# Patient Record
Sex: MALE | Race: WHITE | NOT HISPANIC OR LATINO | Employment: OTHER | URBAN - METROPOLITAN AREA
[De-identification: names, ages, dates, MRNs, and addresses within clinical notes are randomized per-mention and may not be internally consistent; named-entity substitution may affect disease eponyms.]

---

## 2023-11-25 ENCOUNTER — APPOINTMENT (EMERGENCY)
Dept: RADIOLOGY | Facility: HOSPITAL | Age: 53
End: 2023-11-25
Payer: COMMERCIAL

## 2023-11-25 ENCOUNTER — HOSPITAL ENCOUNTER (EMERGENCY)
Facility: HOSPITAL | Age: 53
Discharge: HOME | End: 2023-11-25
Attending: EMERGENCY MEDICINE
Payer: COMMERCIAL

## 2023-11-25 VITALS
SYSTOLIC BLOOD PRESSURE: 134 MMHG | TEMPERATURE: 97.5 F | RESPIRATION RATE: 17 BRPM | HEART RATE: 74 BPM | DIASTOLIC BLOOD PRESSURE: 84 MMHG | OXYGEN SATURATION: 98 %

## 2023-11-25 DIAGNOSIS — R07.9 CHEST PAIN, UNSPECIFIED: Primary | ICD-10-CM

## 2023-11-25 DIAGNOSIS — R79.89 ELEVATED SERUM CREATININE: ICD-10-CM

## 2023-11-25 LAB
ALBUMIN SERPL BCP-MCNC: 4.5 G/DL (ref 3.5–5)
ALP SERPL-CCNC: 81 U/L (ref 34–104)
ALT SERPL W P-5'-P-CCNC: 47 U/L (ref 7–52)
ANION GAP SERPL CALCULATED.3IONS-SCNC: 9 MMOL/L
AST SERPL W P-5'-P-CCNC: 23 U/L (ref 13–39)
BASOPHILS # BLD AUTO: 0.04 THOUSANDS/ÂΜL (ref 0–0.1)
BASOPHILS NFR BLD AUTO: 0 % (ref 0–1)
BILIRUB SERPL-MCNC: 0.35 MG/DL (ref 0.2–1)
BUN SERPL-MCNC: 22 MG/DL (ref 5–25)
CALCIUM SERPL-MCNC: 9.4 MG/DL (ref 8.4–10.2)
CARDIAC TROPONIN I PNL SERPL HS: 3 NG/L
CHLORIDE SERPL-SCNC: 102 MMOL/L (ref 96–108)
CO2 SERPL-SCNC: 26 MMOL/L (ref 21–32)
CREAT SERPL-MCNC: 1.44 MG/DL (ref 0.6–1.3)
EOSINOPHIL # BLD AUTO: 0.15 THOUSAND/ÂΜL (ref 0–0.61)
EOSINOPHIL NFR BLD AUTO: 2 % (ref 0–6)
ERYTHROCYTE [DISTWIDTH] IN BLOOD BY AUTOMATED COUNT: 12.7 % (ref 11.6–15.1)
GFR SERPL CREATININE-BSD FRML MDRD: 55 ML/MIN/1.73SQ M
GLUCOSE SERPL-MCNC: 96 MG/DL (ref 65–140)
HCT VFR BLD AUTO: 41.4 % (ref 36.5–49.3)
HGB BLD-MCNC: 14.1 G/DL (ref 12–17)
IMM GRANULOCYTES # BLD AUTO: 0.02 THOUSAND/UL (ref 0–0.2)
IMM GRANULOCYTES NFR BLD AUTO: 0 % (ref 0–2)
LYMPHOCYTES # BLD AUTO: 3.23 THOUSANDS/ÂΜL (ref 0.6–4.47)
LYMPHOCYTES NFR BLD AUTO: 35 % (ref 14–44)
MCH RBC QN AUTO: 31.3 PG (ref 26.8–34.3)
MCHC RBC AUTO-ENTMCNC: 34.1 G/DL (ref 31.4–37.4)
MCV RBC AUTO: 92 FL (ref 82–98)
MONOCYTES # BLD AUTO: 0.93 THOUSAND/ÂΜL (ref 0.17–1.22)
MONOCYTES NFR BLD AUTO: 10 % (ref 4–12)
NEUTROPHILS # BLD AUTO: 4.82 THOUSANDS/ÂΜL (ref 1.85–7.62)
NEUTS SEG NFR BLD AUTO: 53 % (ref 43–75)
NRBC BLD AUTO-RTO: 0 /100 WBCS
PLATELET # BLD AUTO: 268 THOUSANDS/UL (ref 149–390)
PMV BLD AUTO: 10.4 FL (ref 8.9–12.7)
POTASSIUM SERPL-SCNC: 4 MMOL/L (ref 3.5–5.3)
PROT SERPL-MCNC: 7.9 G/DL (ref 6.4–8.4)
RBC # BLD AUTO: 4.51 MILLION/UL (ref 3.88–5.62)
SODIUM SERPL-SCNC: 137 MMOL/L (ref 135–147)
WBC # BLD AUTO: 9.19 THOUSAND/UL (ref 4.31–10.16)

## 2023-11-25 PROCEDURE — 84484 ASSAY OF TROPONIN QUANT: CPT | Performed by: EMERGENCY MEDICINE

## 2023-11-25 PROCEDURE — 36415 COLL VENOUS BLD VENIPUNCTURE: CPT | Performed by: EMERGENCY MEDICINE

## 2023-11-25 PROCEDURE — 99285 EMERGENCY DEPT VISIT HI MDM: CPT

## 2023-11-25 PROCEDURE — 99285 EMERGENCY DEPT VISIT HI MDM: CPT | Performed by: EMERGENCY MEDICINE

## 2023-11-25 PROCEDURE — 85025 COMPLETE CBC W/AUTO DIFF WBC: CPT | Performed by: EMERGENCY MEDICINE

## 2023-11-25 PROCEDURE — 93005 ELECTROCARDIOGRAM TRACING: CPT

## 2023-11-25 PROCEDURE — 80053 COMPREHEN METABOLIC PANEL: CPT | Performed by: EMERGENCY MEDICINE

## 2023-11-25 PROCEDURE — 71046 X-RAY EXAM CHEST 2 VIEWS: CPT

## 2023-11-25 RX ORDER — ATORVASTATIN CALCIUM 10 MG/1
10 TABLET, FILM COATED ORAL DAILY
COMMUNITY

## 2023-11-25 RX ORDER — BICTEGRAVIR SODIUM, EMTRICITABINE, AND TENOFOVIR ALAFENAMIDE FUMARATE 30; 120; 15 MG/1; MG/1; MG/1
TABLET ORAL
COMMUNITY

## 2023-11-25 RX ORDER — BUPROPION HYDROCHLORIDE 150 MG/1
150 TABLET ORAL DAILY
COMMUNITY

## 2023-11-26 LAB
ATRIAL RATE: 70 BPM
P AXIS: 69 DEGREES
PR INTERVAL: 150 MS
QRS AXIS: 67 DEGREES
QRSD INTERVAL: 82 MS
QT INTERVAL: 368 MS
QTC INTERVAL: 397 MS
T WAVE AXIS: 23 DEGREES
VENTRICULAR RATE: 70 BPM

## 2023-11-26 NOTE — DISCHARGE INSTRUCTIONS
For your chest pain was negative and showed no acute findings for the chest discomfort. Your cardiac enzymes (troponin), ECG, and chest x-ray were all normal.  The symptoms may be due to muscle pain or spasm of the pectoral muscle on the left side. Incidentally, you are found to have a slightly elevated serum creatinine level, which is a marker of kidney function. I recommend following up with your primary care provider once you return back to [place].

## 2023-11-26 NOTE — ED PROVIDER NOTES
History  Chief Complaint   Patient presents with    Chest Pain     Pt states L sided chest discomfort that started tonight states feeling "pressure on L side side"  denies sob, states intermittent  pressure, no cardiac hx or respiratory hx. No meds PTA. 82-year-old male with history of HLD presents to the ED for evaluation of intermittent episodes of left-sided chest pain over the last 3 days. Patient states that over the last 3 days he has had 2 episodes of brief, self-limiting chest pain. The episodes last a few seconds before resolving spontaneously. He describes it as a tightness on the left side of his chest that is non-radiating. He initially thought it was due to a muscle spasm, however when he told his family he was advised to come to the ER for evaluation. He denies any recent strenuous exercise, heavy lifting, or direct trauma to his chest.  No fever, chills, cough, dyspnea, wheezing, palpitations, abdominal pain, nausea, vomiting, diarrhea, urinary symptoms, back pain, neck pain, headache, numbness, or weakness. No prior history of similar episodes of chest pain. No known cardiac history, no family history of early cardiac disease. He did not take any medications prior to arrival.        Prior to Admission Medications   Prescriptions Last Dose Informant Patient Reported? Taking? Bictegravir-Emtricitab-Tenofov (Biktarvy) -15 MG TABS  Self Yes Yes   Sig: Take by mouth   atorvastatin (LIPITOR) 10 mg tablet Unknown Self Yes No   Sig: Take 10 mg by mouth daily   buPROPion (WELLBUTRIN XL) 150 mg 24 hr tablet 11/25/2023 Self Yes Yes   Sig: Take 150 mg by mouth daily   glucose 4 g chewable tablet   Yes Yes   Sig: Chew 16 g as needed for low blood sugar      Facility-Administered Medications: None       History reviewed. No pertinent past medical history. Past Surgical History:   Procedure Laterality Date    APPENDECTOMY         History reviewed. No pertinent family history.   I have reviewed and agree with the history as documented. E-Cigarette/Vaping    E-Cigarette Use Never User      E-Cigarette/Vaping Substances     Social History     Tobacco Use    Smoking status: Former     Types: Cigarettes   Vaping Use    Vaping Use: Never used   Substance Use Topics    Alcohol use: Never    Drug use: Never       Review of Systems   Constitutional:  Negative for chills and fever. HENT:  Negative for congestion, rhinorrhea and sore throat. Respiratory:  Negative for cough and shortness of breath. Cardiovascular:  Positive for chest pain. Negative for palpitations. Gastrointestinal:  Negative for abdominal pain, diarrhea, nausea and vomiting. Genitourinary:  Negative for dysuria and hematuria. Musculoskeletal:  Negative for back pain and neck pain. Neurological:  Negative for weakness, light-headedness, numbness and headaches. All other systems reviewed and are negative. Physical Exam  Physical Exam  Vitals and nursing note reviewed. Constitutional:       General: He is not in acute distress. Appearance: Normal appearance. He is well-developed and normal weight. He is not ill-appearing, toxic-appearing or diaphoretic. HENT:      Head: Normocephalic and atraumatic. Right Ear: External ear normal.      Left Ear: External ear normal.      Nose: Nose normal.      Mouth/Throat:      Mouth: Mucous membranes are moist.      Pharynx: Oropharynx is clear. No oropharyngeal exudate or posterior oropharyngeal erythema. Eyes:      Extraocular Movements: Extraocular movements intact. Conjunctiva/sclera: Conjunctivae normal.      Pupils: Pupils are equal, round, and reactive to light. Cardiovascular:      Rate and Rhythm: Normal rate and regular rhythm. Pulses: Normal pulses. Heart sounds: Normal heart sounds. Pulmonary:      Effort: Pulmonary effort is normal. No respiratory distress. Breath sounds: Normal breath sounds. No wheezing or rales.    Chest:      Chest wall: No tenderness. Abdominal:      General: Abdomen is flat. Bowel sounds are normal. There is no distension. Palpations: Abdomen is soft. Tenderness: There is no abdominal tenderness. There is no right CVA tenderness, left CVA tenderness or guarding. Musculoskeletal:         General: No swelling or tenderness. Normal range of motion. Cervical back: Normal range of motion and neck supple. No tenderness. Right lower leg: No tenderness. No edema. Left lower leg: No tenderness. No edema. Skin:     General: Skin is warm and dry. Neurological:      General: No focal deficit present. Mental Status: He is alert and oriented to person, place, and time.          Vital Signs  ED Triage Vitals   Temperature Pulse Respirations Blood Pressure SpO2   11/25/23 2006 11/25/23 2000 11/25/23 2000 11/25/23 2000 11/25/23 2000   97.5 °F (36.4 °C) 77 21 134/86 99 %      Temp Source Heart Rate Source Patient Position - Orthostatic VS BP Location FiO2 (%)   11/25/23 2006 11/25/23 2200 11/25/23 2000 11/25/23 2000 --   Oral Monitor Sitting Right arm       Pain Score       --                  Vitals:    11/25/23 2000 11/25/23 2100 11/25/23 2200   BP: 134/86  134/84   Pulse: 77 75 74   Patient Position - Orthostatic VS: Sitting  Sitting         Visual Acuity      ED Medications  Medications - No data to display    Diagnostic Studies  Results Reviewed       Procedure Component Value Units Date/Time    HS Troponin 0hr (reflex protocol) [[id number]]  (Normal) Collected: 11/25/23 2053    Lab Status: Final result Specimen: Blood from Arm, Right Updated: 11/25/23 2123     hs TnI 0hr 3 ng/L     Comprehensive metabolic panel [[id number]]  (Abnormal) Collected: 11/25/23 2053    Lab Status: Final result Specimen: Blood from Arm, Right Updated: 11/25/23 2115     Sodium 137 mmol/L      Potassium 4.0 mmol/L      Chloride 102 mmol/L      CO2 26 mmol/L      ANION GAP 9 mmol/L      BUN 22 mg/dL      Creatinine 1.44 mg/dL Glucose 96 mg/dL      Calcium 9.4 mg/dL      AST 23 U/L      ALT 47 U/L      Alkaline Phosphatase 81 U/L      Total Protein 7.9 g/dL      Albumin 4.5 g/dL      Total Bilirubin 0.35 mg/dL      eGFR 55 ml/min/1.73sq m     Narrative:      [facility] (CKD):     Stage 1 with normal or high GFR (GFR > 90 mL/min/1.73 square meters)    Stage 2 Mild CKD (GFR = 60-89 mL/min/1.73 square meters)    Stage 3A Moderate CKD (GFR = 45-59 mL/min/1.73 square meters)    Stage 3B Moderate CKD (GFR = 30-44 mL/min/1.73 square meters)    Stage 4 Severe CKD (GFR = 15-29 mL/min/1.73 square meters)    Stage 5 End Stage CKD (GFR <15 mL/min/1.73 square meters)  Note: GFR calculation is accurate only with a steady state creatinine    CBC and differential [[id number]] Collected: 11/25/23 2053    Lab Status: Final result Specimen: Blood from Arm, Right Updated: 11/25/23 2058     WBC 9.19 Thousand/uL      RBC 4.51 Million/uL      Hemoglobin 14.1 g/dL      Hematocrit 41.4 %      MCV 92 fL      MCH 31.3 pg      MCHC 34.1 g/dL      RDW 12.7 %      MPV 10.4 fL      Platelets 571 Thousands/uL      nRBC 0 /100 WBCs      Neutrophils Relative 53 %      Immat GRANS % 0 %      Lymphocytes Relative 35 %      Monocytes Relative 10 %      Eosinophils Relative 2 %      Basophils Relative 0 %      Neutrophils Absolute 4.82 Thousands/µL      Immature Grans Absolute 0.02 Thousand/uL      Lymphocytes Absolute 3.23 Thousands/µL      Monocytes Absolute 0.93 Thousand/µL      Eosinophils Absolute 0.15 Thousand/µL      Basophils Absolute 0.04 Thousands/µL                    XR chest 2 views   ED Interpretation by Leny Hathaway MD (11/25 2123)   No acute cardiopulmonary disease process on my interpretation.                  Procedures  Procedures         ED Course  ED Course as of 11/26/23 0001   Sat Nov 25, 2023 2006 Procedure Note: EKG  Date/Time: 11/25/23 8:02 PM   Interpreted by: Leny Hathaway MD  Indications / Diagnosis: CP  ECG reviewed by me, the ED Physician: yes   The EKG demonstrates:  Rhythm: normal sinus rhythm 70 bpm  Intervals: normal intervals  Axis: normal axis  QRS/Blocks: normal QRS  ST Changes: No acute ST-T wave changes. No STEMI. Normal ECG. No prior ECG available for comparison. 2100 CBC and differential  All WNL. 2122 Comprehensive metabolic panel(!)  WNL with the exception of mildly elevated creatinine 1.44.   2123 XR chest 2 views  No acute cardiopulmonary disease process on my interpretation. 2123 hs TnI 0hr: 3             HEART Risk Score      Flowsheet Row Most Recent Value   Heart Score Risk Calculator    History 0 Filed at: 11/25/2023 2124   ECG 0 Filed at: 11/25/2023 2124   Age 1 Filed at: 11/25/2023 2124   Risk Factors 1 Filed at: 11/25/2023 2124   Troponin 0 Filed at: 11/25/2023 2124   HEART Score 2 Filed at: 11/25/2023 2124                                        Medical Decision Making  61-year-old male with history of HLD presents to the ED for evaluation of intermittent episodes of left-sided chest pain over the last 3 days. Patient states that over the last 3 days he has had 2 episodes of brief, self-limiting chest pain. The episodes last a few seconds before resolving spontaneously. He describes it as a tightness on the left side of his chest that is non-radiating. He initially thought it was due to a muscle spasm, however when he told his family he was advised to come to the ER for evaluation. He denies any recent strenuous exercise, heavy lifting, or direct trauma to his chest.  No fever, chills, cough, dyspnea, wheezing, palpitations, abdominal pain, nausea, vomiting, diarrhea, urinary symptoms, back pain, neck pain, headache, numbness, or weakness. No prior history of similar episodes of chest pain. No known cardiac history, no family history of early cardiac disease. He did not take any medications prior to arrival.    Vital signs reviewed.   See physical exam documentation for exam findings. Differential diagnosis includes but is not limited to ACS, pneumonia, pneumothorax, and/or musculoskeletal chest wall pain. Will evaluate with labs, ECG, and chest x-ray. See ED course for independent interpretation of results. Cardiac workup overall negative. Heart score 2. Mild creatinine elevation and I advised the patient of this finding and the need to follow-up with his primary care provider. The patient is currently in this area for work and receives his medical care in [place]. Patient reports that he will follow-up with his primary care provider when he returns home to [place]. I discussed all findings, treatment, red flags/return precautions, and outpatient follow-up and the patient/family understand and agree. Stable for discharge. Amount and/or Complexity of Data Reviewed  Labs: ordered. Decision-making details documented in ED Course. Radiology: ordered and independent interpretation performed. Decision-making details documented in ED Course. Disposition  Final diagnoses:   Chest pain, unspecified   Elevated serum creatinine     Time reflects when diagnosis was documented in both MDM as applicable and the Disposition within this note       Time User Action Codes Description Comment    11/25/2023  9:24 PM Chelsey Segura Add [R07.9] Chest pain, unspecified     11/25/2023  9:24 PM Chelsey Segura Add [R79.89] Elevated serum creatinine           ED Disposition       ED Disposition   Discharge    Condition   Stable    Date/Time   Sat Nov 25, 2023 2153    [address] discharge to home/self care.                    Follow-up Information       Follow up With Specialties Details Why Contact Info Additional [address] [facility],[address] Emergency Department Emergency Medicine Go to  If symptoms worsen [address] [phone] [address] [facility] Emergency Department, [address], [address]            Discharge Medication List as of 11/25/2023  9:55 PM        CONTINUE these medications which have NOT CHANGED    Details   Bictegravir-Emtricitab-Tenofov (Biktarvy) -15 MG TABS Take by mouth, Historical Med      buPROPion (WELLBUTRIN XL) 150 mg 24 hr tablet Take 150 mg by mouth daily, Historical Med      glucose 4 g chewable tablet Chew 16 g as needed for low blood sugar, Historical Med      atorvastatin (LIPITOR) 10 mg tablet Take 10 mg by mouth daily, Historical Med             No discharge procedures on file.     PDMP Review       None            ED Provider  Electronically Signed by             Renetta Quinonez MD  11/26/23 0001